# Patient Record
(demographics unavailable — no encounter records)

---

## 2025-03-26 NOTE — DISCUSSION/SUMMARY
[de-identified] : General Dx Discussion The patient was advised of the diagnosis. The natural history of the pathology was explained in full to the patient in layman's terms. All questions were answered. The risks and benefits of surgical and non-surgical treatment alternatives were explained in full to the patient.  case discussed outside xrays reviewed  will get a mri of her lt shoudler to eval for a tear / occult fx - injury  will get a mri rt knee to eval for occult fx  neeliosn answered  f/u after mris

## 2025-03-26 NOTE — PHYSICAL EXAM
[Left] : left shoulder [5 ___] : forward flexion 5[unfilled]/5 [4___] : external rotation 4[unfilled]/5 [5___] : internal rotation 5[unfilled]/5 [TWNoteComboBox7] : active forward flexion 100 degrees [de-identified] : active abduction 60 degrees [TWNoteComboBox6] : internal rotation 45 degrees [de-identified] : external rotation 50 degrees [Bilateral] : knee bilaterally [] : no calf tenderness [FreeTextEntry8] : mild anterior tenderness lt knee  rt knee tenderness anterior and proximal tibis

## 2025-03-26 NOTE — HISTORY OF PRESENT ILLNESS
[Sudden] : sudden [8] : 8 [Radiating] : radiating [Nothing helps with pain getting better] : Nothing helps with pain getting better [Standing] : standing [] : no [FreeTextEntry1] : knees and left shoulder [FreeTextEntry3] : 3-20-25 [FreeTextEntry5] : [facility]  [FreeTextEntry6] : bruising  [FreeTextEntry7] : left arm and right leg [de-identified] : lifting leg up  [de-identified] : PCP [de-identified] : barbara Mckeon

## 2025-03-26 NOTE — DISCUSSION/SUMMARY
[de-identified] : General Dx Discussion The patient was advised of the diagnosis. The natural history of the pathology was explained in full to the patient in layman's terms. All questions were answered. The risks and benefits of surgical and non-surgical treatment alternatives were explained in full to the patient.  case discussed outside xrays reviewed  will get a mri of her lt shoudler to eval for a tear / occult fx - injury  will get a mri rt knee to eval for occult fx  neeliosn answered  f/u after mris

## 2025-03-26 NOTE — HISTORY OF PRESENT ILLNESS
[Sudden] : sudden [8] : 8 [Radiating] : radiating [Nothing helps with pain getting better] : Nothing helps with pain getting better [Standing] : standing [] : no [FreeTextEntry1] : knees and left shoulder [FreeTextEntry3] : 3-20-25 [FreeTextEntry5] : pt fell in [facility]  [FreeTextEntry6] : bruising  [FreeTextEntry7] : left arm and right leg [de-identified] : lifting leg up  [de-identified] : PCP [de-identified] : barbara Mckeon

## 2025-03-26 NOTE — DATA REVIEWED
[Knee] : knee [Report was reviewed and noted in the chart] : The report was reviewed and noted in the chart [Outside X-rays] : outside x-rays [Left] : left [Shoulder] : shoulder

## 2025-05-06 NOTE — PHYSICAL EXAM
[Left] : left shoulder [Standing] : standing [Mild] : mild [5 ___] : forward flexion 5[unfilled]/5 [TWNoteComboBox6] : internal rotation 60 degrees [de-identified] : external rotation 70 degrees [Bilateral] : knee bilaterally [5___] : hamstring 5[unfilled]/5 [] : patient ambulates without assistive device [FreeTextEntry8] : mild anterior tenderness lt knee  rt knee tendernes lateral side  [TWNoteComboBox7] : flexion 110 degrees [de-identified] : extension 3 degrees

## 2025-05-06 NOTE — DISCUSSION/SUMMARY
[de-identified] : General Dx Discussion The patient was advised of the diagnosis. The natural history of the pathology was explained in full to the patient in layman's terms. All questions were answered. The risks and benefits of surgical and non-surgical treatment alternatives were explained in full to the patient.  she wants to try a HEP  will let her do this f/u 6 weeks  motion and function has improves

## 2025-06-24 NOTE — HISTORY OF PRESENT ILLNESS
[8] : 8 [1] : 2 [Localized] : localized [Shooting] : shooting [Standing] : standing [de-identified] : rt knee doing well has cont pain and loss of motion lt shoulder  [] : no [FreeTextEntry1] : rt knee and l shoulder [FreeTextEntry6] : soreness [FreeTextEntry9] : HEP,Hemp cream  [de-identified] : getting up,moving certain way [de-identified] : stopped PT

## 2025-06-24 NOTE — DISCUSSION/SUMMARY
[de-identified] : General Dx Discussion The patient was advised of the diagnosis. The natural history of the pathology was explained in full to the patient in layman's terms. All questions were answered. The risks and benefits of surgical and non-surgical treatment alternatives were explained in full to the patient.  rt knee stable  will cont PT for her lt shoulder will get a CSI  surgery discussed but will monitor her for now

## 2025-06-24 NOTE — PHYSICAL EXAM
[Left] : left shoulder [Standing] : standing [Mild] : mild [5 ___] : forward flexion 5[unfilled]/5 [TWNoteComboBox6] : internal rotation 60 degrees [de-identified] : external rotation 70 degrees [Bilateral] : knee bilaterally [5___] : hamstring 5[unfilled]/5 [Negative] : negative anterior draw [] : patient ambulates without assistive device [FreeTextEntry3] : healed abrasion  [FreeTextEntry8] : mild anterior tenderness lt knee  rt knee tendernes lateral side  [TWNoteComboBox7] : flexion 120 degrees [de-identified] : extension 3 degrees